# Patient Record
Sex: FEMALE | Race: WHITE | Employment: OTHER | URBAN - METROPOLITAN AREA
[De-identification: names, ages, dates, MRNs, and addresses within clinical notes are randomized per-mention and may not be internally consistent; named-entity substitution may affect disease eponyms.]

---

## 2017-07-25 ENCOUNTER — OFFICE VISIT (OUTPATIENT)
Dept: HEMATOLOGY | Age: 67
End: 2017-07-25

## 2017-07-25 VITALS
SYSTOLIC BLOOD PRESSURE: 149 MMHG | TEMPERATURE: 99 F | HEART RATE: 70 BPM | HEIGHT: 62 IN | WEIGHT: 254.4 LBS | DIASTOLIC BLOOD PRESSURE: 71 MMHG | BODY MASS INDEX: 46.81 KG/M2

## 2017-07-25 DIAGNOSIS — K75.81 NASH (NONALCOHOLIC STEATOHEPATITIS): Primary | ICD-10-CM

## 2017-07-25 RX ORDER — FUROSEMIDE 40 MG/1
TABLET ORAL DAILY
COMMUNITY

## 2017-07-25 RX ORDER — DULOXETIN HYDROCHLORIDE 60 MG/1
60 CAPSULE, DELAYED RELEASE ORAL DAILY
COMMUNITY

## 2017-07-25 RX ORDER — OMEPRAZOLE 20 MG/1
20 CAPSULE, DELAYED RELEASE ORAL DAILY
COMMUNITY

## 2017-07-25 RX ORDER — GABAPENTIN 400 MG/1
400 CAPSULE ORAL 3 TIMES DAILY
COMMUNITY

## 2017-07-25 NOTE — PROGRESS NOTES
[address] MD Marty, [address]       SUDHAKAR Yanes PA-C Charyl Chock, NP Michele Lab, NP        at [address]     [address] [place] [address], [address] Gabby Doe  22.     [phone]     FAX: [phone]    at [address], [id number] [place] [address], [address]     [phone]     FAX: [phone]       Patient Care Team:  Clare Bledsoe MD as PCP - General (Family Practice)  Richard Burleson MD (Gastroenterology)  Nghia Moore MD (Inactive) (Neurology)      Problem List  Date Reviewed: 7/27/2013          Codes Class Noted    Borderline diabetes ICD-10-CM: R73.03  ICD-9-CM: 790.29  7/31/2012        Snoring ICD-10-CM: R06.83  ICD-9-CM: 786.09  7/31/2012        Morbid obesity ([facility] ICD-10-CM: E66.01  ICD-9-CM: 278.01  7/31/2012        CROWELL (nonalcoholic steatohepatitis) ICD-10-CM: K75.81  ICD-9-CM: 571.8  6/15/2012        S/P cholecystectomy ICD-10-CM: Z90.49  ICD-9-CM: V45.79  6/15/2012    Overview Signed 6/15/2012 11:06 AM by Tiara Auguste MD     3/2012             Hypertension ICD-10-CM: I10  ICD-9-CM: 401.9  6/15/2012        Multiple sclerosis ([facility] [facility] ICD-10-CM: G35  ICD-9-CM: 397  6/15/2012        Restless legs ICD-10-CM: G25.81  ICD-9-CM: 333.94  6/15/2012    Overview Signed 6/15/2012 11:08 AM by Tiara Auguste MD     Possibly secondary to MS             Hypothyroidism ICD-10-CM: E03.9  ICD-9-CM: 244.9  6/15/2012        S/P total knee replacement ICD-10-CM: H04.474  ICD-9-CM: V43.65  6/15/2012    Overview Signed 6/15/2012 11:12 AM by Tiara Auguste MD     Left 2010             Obesity ICD-10-CM: O51.3  ICD-9-CM: 278.00  6/15/2012              Kristopher Bansal returns to the [address] for monitoring of CROWELL.   The active problem list, all pertinent past medical history, medications, liver histology, endoscopic studies, radiologic findings and laboratory findings related to the liver disorder were reviewed with the patient. She was last seen in 5/2013. A liver biopsy was performed in 3/2012. This demonstrated CROWELL with bridging fibrosis. The most recent laboratory studies indicate that the liver transaminases are normal, alkaline phosphatase is normal, tests of hepatic synthetic and metabolic function are normal, and the platelet count is normal.      The patient has has not had any change in her body weight in the past 4 year. She has explored gastric bypass but has backed away from this. The patient notes fatigue. The patient has limitations in functional activities secondary to other medical problems that are not related to the liver disease. The patient has not experienced problems concentrating, swelling of the abdomen, swelling of the lower extremities, hematemesis, hematochezia. Allergies   Allergen Reactions    Bactrim [Sulfamethoprim] Other (comments)    Dilaudid [Hydromorphone] Hives    Lortab Asa Hives    Percocet [Oxycodone-Acetaminophen] Hives       Current Outpatient Prescriptions   Medication Sig    omeprazole (PRILOSEC) 20 mg capsule Take 20 mg by mouth daily.  DULoxetine (CYMBALTA) 60 mg capsule Take 60 mg by mouth daily.  gabapentin (NEURONTIN) 400 mg capsule Take 400 mg by mouth three (3) times daily.  furosemide (LASIX) 40 mg tablet Take  by mouth daily.  levothyroxine (SYNTHROID) 100 mcg tablet Take  by mouth Daily (before breakfast).  clonazePAM (KLONOPIN) 2 mg tablet Take  by mouth two (2) times a day.  rOPINIRole (REQUIP) 1 mg tablet Take  by mouth three (3) times daily.  glatiramer (COPAXONE) 20 mg injection 20 mg by SubCUTAneous route daily.  multivitamin (ONE A DAY) tablet Take 1 Tab by mouth daily.  citalopram (CELEXA) 40 mg tablet Take 40 mg by mouth daily.  pregabalin (LYRICA) 50 mg capsule Take  by mouth.     lisinopril-hydrochlorothiazide (PRINZIDE, ZESTORETIC) 10-12.5 mg per tablet Take 2 Tabs by mouth daily.  gabapentin enacarbil (HORIZANT) 600 mg Tb24 Take  by mouth. No current facility-administered medications for this visit. SYSTEM REVIEW NOT RELATED TO LIVER DISEASE OR REVIEWED ABOVE:  Constitution systems: Negative for fever, chills, weight gain, weight loss. Eyes: Negative for visual changes. ENT: Negative for sore throat, painful swallowing. Respiratory: Negative for cough, hemoptysis, SOB. Cardiology: Negative for chest pain, palpitations. GI:  Negative for constipation or diarrhea. : Negative for urinary frequency, dysuria, hematuria, nocturia. Skin: Negative for rash. Hematology: Negative for easy bruising, blood clots. Musculo-skelatal: Negative for back pain, muscle pain, weakness. Neurologic: Negative for headaches, dizziness, vertigo, memory problems not related to HE. Psychology: Negative for anxiety, depression. FAMILY HISTORY:  There is no family history of liver disease. SOCIAL HISTORY:  The patient is . There are 2 children. The patient has never used tobacco products. The patient has never consumed significant amounts of alcohol. 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 The patient retired in [address] [place] [address] secondary to [address] [place] [address]:  Visit Vitals    /71 (BP 1 Location: Left arm, BP Patient Position: Sitting)    Pulse 70    Temp 99 °F (37.2 °C) (Tympanic)    Ht 5' 2\" (1.575 m)    Wt 254 lb 6.4 oz (115.4 kg)    BMI 46.53 kg/m2     General: No acute distress. Eyes: Sclera anicteric. ENT: No oral lesions. Thyroid normal.  Nodes: No adenopathy. Skin: No spider angiomata. No jaundice. No palmar erythema. Respiratory: Lungs clear to auscultation. Cardiovascular: Regular heart rate. No murmurs. No JVD. Abdomen: Soft non-tender. Liver size normal to percussion/palpation.   Spleen not palpable. No obvious ascites. Extremities: No edema. No muscle wasting. No gross arthritic changes. Neurologic: Alert and oriented. Cranial nerves grossly intact. No asterixsis. LABORATORY STUDIES:  From 2017  AST/ALT/ALP/T Bili/ALB:  55/34/129/0.5/4.1  WBC/HB/PLT/INR:  5.3/12.0/178  BUN/CREAT:  20/1.08    SEROLOGIES:  2010. HBsurface antigen negative, anti-HCV negative. Serologies Latest Ref Rng 6/15/2012   Ferritin 13 - 150 ng/mL 308 (H)   Iron % Saturation 15 - 55 % 31   SUSAN, IFA  Negative   ASMCA 0 - 19 Units 8   Ceruloplasmin 16.0 - 45.0 mg/dL 28.0   Alpha-1 antitrypsin level 90 - 200 mg/dL 143     LIVER HISTOLOGY:  3/2012. CROWELL with bridging fibrosis. ENDOSCOPIC PROCEDURES:  Not available or performed    RADIOLOGY:  Not available or performed    OTHER TESTIN2016. ECHO. LV hypertrophy. Normal LVEF  Normal valves. 2017. Quantiferon TB Gold. Negative    ASSESSMENT AND PLAN:  CROWELL with bridging fibrosis. We now have several clinical trials using promicing medications for patients with CROWELL. I am concerned that with having stage 3 fibrosis in 212 she has now progressed to cirrhosis. I have reviewed her medications and none seem to be an exclusion to enter a clinical trial.    She has been unable to loose any weight over the past 4 years with diet    She hs decided she does not want to pursue gastric bypass    The patient was directed to continue all current medications at the current dosages. The patient was counseled regarding alcohol consumption and that this could contribute to fatty liver disease. There are no contraindications for the patient to take any medications that are necessary for treatment of other medical issues including medications for diabetes mellitus and hypercholesterolemia. The need for vaccination against viral hepatitis A and B will be assessed with serologic and instituted as appropriate.     [facility] 87 in 4 months.   She will return sooner if she decides to enter a clinical trial.    Virgilio Gray MD  Liver Vina [address] [address] Kathie Billy 7  Gabby Jeronimo  22.  [phone]

## 2017-07-25 NOTE — MR AVS SNAPSHOT
Visit Information Date & Time Provider Department Dept. Phone Encounter #  
 7/25/2017 12:30 PM Blu [facility]e MD [facility] [id number] Follow-up Instructions Return in about 4 months (around 11/25/2017) for NP. Your Appointments 11/14/2017 10:00 AM  
Follow Up with April Clarissa Johnson NP [facility] Kathleen Zac ([address]) Appt Note: 4 month follow up [address] [place] [address] [phone] [place] [address] [facility] [address]  
[address] [facility] [address] Upcoming Health Maintenance Date Due Hepatitis C Screening 1950 DTaP/Tdap/Td series (1 - Tdap) 6/29/1970 FOBT Q 1 YEAR AGE 50-75 6/29/1999 ZOSTER VACCINE AGE 60> 4/29/2009 GLAUCOMA SCREENING Q2Y 6/29/2014 OSTEOPOROSIS SCREENING (DEXA) 6/29/2014 Pneumococcal 65+ Low/Medium Risk (1 of 2 - PCV13) 6/29/2014 MEDICARE YEARLY EXAM 6/29/2014 INFLUENZA AGE 9 TO ADULT 8/1/2017 BREAST CANCER SCRN MAMMOGRAM 11/29/2018 Allergies as of 7/25/2017  Review Complete On: 5/31/2013 By: Benito Garner LPN Severity Noted Reaction Type Reactions Bactrim [Sulfamethoprim]  07/25/2017    Other (comments) Dilaudid [Hydromorphone]  06/15/2012   Side Effect Hives Lortab Asa  06/15/2012   Side Effect Hives Percocet [Oxycodone-acetaminophen]  06/15/2012   Side Effect Hives Current Immunizations  Never Reviewed No immunizations on file. Not reviewed this visit Vitals BP Pulse Temp Height(growth percentile) Weight(growth percentile) BMI  
 149/71 (BP 1 Location: Left arm, BP Patient Position: Sitting) 70 99 °F (37.2 °C) (Tympanic) 5' 2\" (1.575 m) 254 lb 6.4 oz (115.4 kg) 46.53 kg/m2 Smoking Status Former Smoker BMI and BSA Data Body Mass Index Body Surface Area  
 46.53 kg/m 2 2.25 m 2 Your Updated Medication List  
  
   
 This list is accurate as of: 7/25/17  1:04 PM.  Always use your most recent med list.  
  
  
  
  
 citalopram 40 mg tablet Commonly known as:  Rafael Rochaippo Take 40 mg by mouth daily. clonazePAM 2 mg tablet Commonly known as:  Blanca Leslie Take  by mouth two (2) times a day. COPAXONE 20 mg/mL injection Generic drug:  glatiramer 20 mg by SubCUTAneous route daily. DULoxetine 60 mg capsule Commonly known as:  CYMBALTA Take 60 mg by mouth daily. furosemide 40 mg tablet Commonly known as:  LASIX Take  by mouth daily. gabapentin 400 mg capsule Commonly known as:  NEURONTIN Take 400 mg by mouth three (3) times daily. HORIZANT 600 mg Tber Generic drug:  gabapentin enacarbil Take  by mouth.  
  
 levothyroxine 100 mcg tablet Commonly known as:  SYNTHROID Take  by mouth Daily (before breakfast). lisinopril-hydroCHLOROthiazide 10-12.5 mg per tablet Commonly known as:  Kathee Adalgisa Take 2 Tabs by mouth daily. LYRICA 50 mg capsule Generic drug:  pregabalin Take  by mouth.  
  
 multivitamin tablet Commonly known as:  ONE A DAY Take 1 Tab by mouth daily. omeprazole 20 mg capsule Commonly known as:  PRILOSEC Take 20 mg by mouth daily. REQUIP 1 mg tablet Generic drug:  rOPINIRole Take  by mouth three (3) times daily. Follow-up Instructions Return in about 4 months (around 11/25/2017) for NP. Introducing [facility] & HEALTH SERVICES! Az Gerardo introduces WatchGuard patient portal. Now you can access parts of your medical record, email your doctor's office, and request medication refills online. 1. In your internet browser, go to [email or web address] 2. Click on the First Time User? Click Here link in the Sign In box. You will see the New Member Sign Up page. 3. Enter your WatchGuard Access Code exactly as it appears below.  You will not need to use this code after youve completed the sign-up process. If you do not sign up before the expiration date, you must request a new code. · UNYQ Access Code: [email or web address] Expires: 9/17/2017  7:04 AM 
 
4. Enter the last four digits of your Social Security Number (xxxx) and Date of Birth (mm/dd/yyyy) as indicated and click Submit. You will be taken to the next sign-up page. 5. Create a UNYQ ID. This will be your UNYQ login ID and cannot be changed, so think of one that is secure and easy to remember. 6. Create a UNYQ password. You can change your password at any time. 7. Enter your Password Reset Question and Answer. This can be used at a later time if you forget your password. 8. Enter your e-mail address. You will receive e-mail notification when new information is available in [address] 9. Click Sign Up. You can now view and download portions of your medical record. 10. Click the Download Summary menu link to download a portable copy of your medical information. If you have questions, please visit the Frequently Asked Questions section of the UNYQ website. Remember, UNYQ is NOT to be used for urgent needs. For medical emergencies, dial 911. Now available from your iPhone and Android! Please provide this summary of care documentation to your next provider. Your primary care clinician is listed as Arn Randa. If you have any questions after today's visit, please call [phone].

## 2017-11-14 ENCOUNTER — OFFICE VISIT (OUTPATIENT)
Dept: HEMATOLOGY | Age: 67
End: 2017-11-14

## 2017-11-14 VITALS
SYSTOLIC BLOOD PRESSURE: 171 MMHG | BODY MASS INDEX: 46.93 KG/M2 | OXYGEN SATURATION: 97 % | TEMPERATURE: 95.6 F | WEIGHT: 256.6 LBS | HEART RATE: 68 BPM | DIASTOLIC BLOOD PRESSURE: 75 MMHG

## 2017-11-14 DIAGNOSIS — K75.81 NASH (NONALCOHOLIC STEATOHEPATITIS): Primary | ICD-10-CM

## 2017-11-14 DIAGNOSIS — E66.01 MORBID OBESITY (HCC): ICD-10-CM

## 2017-11-14 DIAGNOSIS — I10 ESSENTIAL HYPERTENSION: ICD-10-CM

## 2017-11-14 DIAGNOSIS — R73.03 BORDERLINE DIABETES: ICD-10-CM

## 2017-11-14 DIAGNOSIS — E03.9 ACQUIRED HYPOTHYROIDISM: ICD-10-CM

## 2017-11-14 RX ORDER — METHYLPHENIDATE HYDROCHLORIDE 10 MG/1
TABLET ORAL
COMMUNITY
Start: 2017-11-13 | End: 2017-11-14 | Stop reason: ALTCHOICE

## 2017-11-14 RX ORDER — BESIFLOXACIN 6 MG/ML
SUSPENSION OPHTHALMIC
COMMUNITY
Start: 2017-10-19 | End: 2017-11-14 | Stop reason: ALTCHOICE

## 2017-11-14 RX ORDER — DOXAZOSIN 4 MG/1
TABLET ORAL
COMMUNITY
Start: 2017-11-01

## 2017-11-14 RX ORDER — TOLTERODINE 4 MG/1
CAPSULE, EXTENDED RELEASE ORAL
COMMUNITY
Start: 2017-11-01

## 2017-11-14 RX ORDER — GLATIRAMER ACETATE 20 MG/ML
INJECTION, SOLUTION SUBCUTANEOUS
COMMUNITY
Start: 2017-10-30 | End: 2017-11-14 | Stop reason: ALTCHOICE

## 2017-11-14 RX ORDER — POTASSIUM CHLORIDE 750 MG/1
CAPSULE, EXTENDED RELEASE ORAL
COMMUNITY
Start: 2017-10-31

## 2017-11-14 RX ORDER — CLONAZEPAM 1 MG/1
TABLET ORAL
COMMUNITY
Start: 2017-11-01

## 2017-11-14 NOTE — MR AVS SNAPSHOT
Visit Information Date & Time Provider Department Dept. Phone Encounter #  
 11/14/2017 10:00 AM April KIMMIE Tolliver, [facility] 219 [id number] Upcoming Health Maintenance Date Due Hepatitis C Screening 1950 DTaP/Tdap/Td series (1 - Tdap) 6/29/1970 FOBT Q 1 YEAR AGE 50-75 6/29/1999 ZOSTER VACCINE AGE 60> 4/29/2009 GLAUCOMA SCREENING Q2Y 6/29/2014 OSTEOPOROSIS SCREENING (DEXA) 6/29/2014 Pneumococcal 65+ Low/Medium Risk (1 of 2 - PCV13) 6/29/2014 MEDICARE YEARLY EXAM 6/29/2014 Influenza Age 5 to Adult 8/1/2017 BREAST CANCER SCRN MAMMOGRAM 11/29/2018 Allergies as of 11/14/2017  Review Complete On: 11/14/2017 By: Rosario Ardon Members, NP Severity Noted Reaction Type Reactions Bactrim [Sulfamethoprim]  07/25/2017    Other (comments) Dilaudid [Hydromorphone]  06/15/2012   Side Effect Hives Lortab Asa  06/15/2012   Side Effect Hives Percocet [Oxycodone-acetaminophen]  06/15/2012   Side Effect Hives Current Immunizations  Never Reviewed No immunizations on file. Not reviewed this visit Vitals BP Pulse Temp Weight(growth percentile) SpO2 BMI  
 171/75 68 95.6 °F (35.3 °C) (Tympanic) 256 lb 9.6 oz (116.4 kg) 97% 46.93 kg/m2 Smoking Status Former Smoker Vitals History BMI and BSA Data Body Mass Index Body Surface Area  
 46.93 kg/m 2 2.26 m 2 Your Updated Medication List  
  
   
This list is accurate as of: 11/14/17 10:35 AM.  Always use your most recent med list.  
  
  
  
  
 clonazePAM 1 mg tablet Commonly known as:  KlonoPIN  
  
 COPAXONE 20 mg/mL injection Generic drug:  glatiramer 20 mg by SubCUTAneous route daily. doxazosin 4 mg tablet Commonly known as:  CARDURA DULoxetine 60 mg capsule Commonly known as:  CYMBALTA Take 60 mg by mouth daily. furosemide 40 mg tablet Commonly known as:  LASIX Take  by mouth daily. gabapentin 400 mg capsule Commonly known as:  NEURONTIN Take 400 mg by mouth three (3) times daily. levothyroxine 100 mcg tablet Commonly known as:  SYNTHROID Take  by mouth Daily (before breakfast). multivitamin tablet Commonly known as:  ONE A DAY Take 1 Tab by mouth daily. omeprazole 20 mg capsule Commonly known as:  PRILOSEC Take 20 mg by mouth daily. potassium chloride SA 10 mEq capsule Commonly known as:  MICRO-K  
  
 REQUIP 1 mg tablet Generic drug:  rOPINIRole Take  by mouth three (3) times daily. tolterodine ER 4 mg ER capsule Commonly known as:  Newton Andrews To-Do List   
 11/30/2017 10:15 AM  
  Appointment with [facility] 4 at [address] [place] [address] ([phone]) Shower or bathe using soap and water. Do not use deodorant, powder, perfumes, or lotion the day of your exam.  If your prior mammograms were not performed at [facility] 6 please bring films with you or forward prior images 2 days before your procedure. Check in at registration 15min before your appointment time unless you were instructed to do otherwise. A script is not necessary, but if you have one, please bring it on the day of the mammogram or have it faxed to the department. [facility] [phone] [facility]  [phone] [facility] 19 AZUL  [phone] Replaced by [facility] [phone] [place] [address] [facility] [phone] Introducing [facility] & HEALTH SERVICES! Columba Carmen introduces Ubooly patient portal. Now you can access parts of your medical record, email your doctor's office, and request medication refills online. 1. In your internet browser, go to [email or web address] 2. Click on the First Time User? Click Here link in the Sign In box. You will see the New Member Sign Up page. 3. Enter your Ubooly Access Code exactly as it appears below. You will not need to use this code after youve completed the sign-up process.  If you do not sign up before the expiration date, you must request a new code. · [email or web address] Access Code: [email or web address] Expires: 2/12/2018 10:35 AM 
 
4. Enter the last four digits of your Social Security Number (xxxx) and Date of Birth (mm/dd/yyyy) as indicated and click Submit. You will be taken to the next sign-up page. 5. Create a [email or web address] ID. This will be your [email or web address] login ID and cannot be changed, so think of one that is secure and easy to remember. 6. Create a [email or web address] password. You can change your password at any time. 7. Enter your Password Reset Question and Answer. This can be used at a later time if you forget your password. 8. Enter your e-mail address. You will receive e-mail notification when new information is available in [address] 9. Click Sign Up. You can now view and download portions of your medical record. 10. Click the Download Summary menu link to download a portable copy of your medical information. If you have questions, please visit the Frequently Asked Questions section of the [email or web address] website. Remember, [email or web address] is NOT to be used for urgent needs. For medical emergencies, dial 911. Now available from your iPhone and Android! Please provide this summary of care documentation to your next provider. Your primary care clinician is listed as America Kelly. If you have any questions after today's visit, please call [phone].

## 2017-11-14 NOTE — PROGRESS NOTES
[address] Mathew Ferguson MD, [address], [facility], [place]       SUDHAKAR Teran PA-C Marcie Saltness, [facility]   SUDHAKAR Suazo NP        at [address], [address] [facility]     [facility], [address]     [phone]     FAX: [phone]    at [address], [id number] [place] [address], [address]     [phone]     FAX: [phone]     Patient Care Team:  Mckayla Malin MD as PCP - General (Family Practice)  Danni Duenas MD (Gastroenterology)  Liza Aguayo MD (Inactive) (Neurology)    Patient Active Problem List   Diagnosis Code    CROWELL (nonalcoholic steatohepatitis) K75.81    S/P cholecystectomy Z90.49    Hypertension I10    Multiple sclerosis ([address] G35    Restless legs G25.81    Hypothyroidism E03.9    S/P total knee replacement Z96.659    Obesity E66.9    Borderline diabetes R73.03    Snoring R06.83    Morbid obesity ([address] E66.01       [facility] CROWELL. The active problem list, all pertinent past medical history, medications, liver histology, endoscopic studies, radiologic findings and laboratory findings related to the liver disorder were reviewed with the patient. A liver biopsy was performed in 3/2012. This demonstrated CROWELL with bridging fibrosis. She has had significant weight gain over time. Her BMI is currently ~47. She has explored gastric bypass but has backed away from this. The patient notes ongoing fatigue and arthralgias secondary to MS. She continues to follow up with neurology on a regular basis to manage this. The patient has limitations in functional activities secondary to other medical problems that are not related to the liver disease.  The patient has not experienced problems concentrating, swelling of the abdomen, swelling of the lower extremities, hematemesis, hematochezia or melena. Allergies   Allergen Reactions    Bactrim [Sulfamethoprim] Other (comments)    Dilaudid [Hydromorphone] Hives    Lortab Asa Hives    Percocet [Oxycodone-Acetaminophen] Hives       Current Outpatient Prescriptions   Medication Sig    clonazePAM (KLONOPIN) 1 mg tablet     doxazosin (CARDURA) 4 mg tablet     potassium chloride SA (MICRO-K) 10 mEq capsule     tolterodine ER (DETROL LA) 4 mg ER capsule     omeprazole (PRILOSEC) 20 mg capsule Take 20 mg by mouth daily.  DULoxetine (CYMBALTA) 60 mg capsule Take 60 mg by mouth daily.  gabapentin (NEURONTIN) 400 mg capsule Take 400 mg by mouth three (3) times daily.  furosemide (LASIX) 40 mg tablet Take  by mouth daily.  levothyroxine (SYNTHROID) 100 mcg tablet Take  by mouth Daily (before breakfast).  rOPINIRole (REQUIP) 1 mg tablet Take  by mouth three (3) times daily.  glatiramer (COPAXONE) 20 mg injection 20 mg by SubCUTAneous route daily.  multivitamin (ONE A DAY) tablet Take 1 Tab by mouth daily. No current facility-administered medications for this visit. SYSTEM REVIEW NOT RELATED TO LIVER DISEASE OR REVIEWED ABOVE:  Constitution systems: Negative for fever, chills, weight gain, weight loss. Eyes: Negative for visual changes. ENT: Negative for sore throat, painful swallowing. Respiratory: Negative for cough, hemoptysis, SOB. Cardiology: Negative for chest pain, palpitations. GI:  Negative for constipation or diarrhea. : Negative for urinary frequency, dysuria, hematuria, nocturia. Skin: Negative for rash. Hematology: Negative for easy bruising, blood clots. Musculo-skelatal: Negative for back pain, muscle pain, weakness. Neurologic: Negative for headaches, dizziness, vertigo, memory problems not related to HE. Psychology: Negative for anxiety, depression. FAMILY HISTORY:  There is no family history of liver disease.     SOCIAL HISTORY:  The patient is . There are 2 children. The patient has never used tobacco products. The patient has never consumed significant amounts of alcohol. 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 The patient retired in [address] [place] [address] secondary to [address]:  Visit Vitals    /75    Pulse 68    Temp 95.6 °F (35.3 °C) (Tympanic)    Wt 256 lb 9.6 oz (116.4 kg)    SpO2 97%    BMI 46.93 kg/m2     General: No acute distress. Eyes: Sclera anicteric. ENT: No oral lesions. Thyroid normal.  Nodes: No adenopathy. Skin: No spider angiomata. No jaundice. No palmar erythema. Respiratory: Lungs clear to auscultation. Cardiovascular: Regular heart rate. No murmurs. No JVD. Abdomen: Soft non-tender. Liver size normal to percussion/palpation. Spleen not palpable. No obvious ascites. Extremities: No edema. No muscle wasting. No gross arthritic changes. Neurologic: Alert and oriented. Cranial nerves grossly intact. No asterixsis. LABORATORY STUDIES:  From 10/2017  AST/ALT/ALP/T Bili/ALB:  46/35/114/0.7/4.2  BUN/CREAT:  27/1.1    From 2017  AST/ALT/ALP/T Bili/ALB:  55/34/129/0.5/4.1  WBC/HB/PLT/INR:  5.3/12.0/178  BUN/CREAT:  20/1.08    SEROLOGIES:  2010. HBsurface antigen negative, anti-HCV negative. Serologies Latest Ref [facility] 6/15/2012   Ferritin 13 - 150 ng/mL 308 (H)   Iron % Saturation 15 - 55 % 31   SUSAN, IFA  Negative   ASMCA 0 - 19 Units 8   Ceruloplasmin 16.0 - 45.0 mg/dL 28.0   Alpha-1 antitrypsin level 90 - 200 mg/dL 143     LIVER HISTOLOGY:  3/2012. CROWELL with bridging fibrosis. ENDOSCOPIC PROCEDURES:  Not available or performed    RADIOLOGY:  Not available or performed    OTHER TESTIN2016. ECHO. LV hypertrophy. Normal LVEF. Normal valves. 2017. Quantiferon TB Gold. Negative    ASSESSMENT AND PLAN:  CROWELL with bridging fibrosis in . Liver enzymes and function are within normal range.  Her liver disease has most likely progressed over the past 5 years given that she has gained weight over time. We now have several clinical trials using promising medications for patients with CROWELL. She is interested in participating in a study. Our research team met with patient today and gave her a consent to read over. She will be contacted within the next 2 weeks to discuss the study. Hypertension. Patient's BP is significantly elevated in the clinic today. Discussed the importance of regular follow up with their PCP to monitor/manage this. Patient verbalized understanding. The patient was directed to continue all current medications at the current dosages. The patient was counseled regarding alcohol consumption and that this could contribute to fatty liver disease. There are no contraindications for the patient to take any medications that are necessary for treatment of other medical issues including medications for diabetes mellitus and hypercholesterolemia. [facility]. surveillance. Ordered an ultrasound today to assess for any liver masses. The need for vaccination against viral hepatitis A and B will be assessed with serologic and instituted as appropriate. [address] [place] [address] 87 in 4 months.  She will return sooner if she decides to enter a clinical trial.    April Doreen Hutchinson NP  Liver [place] [address]  Ph: [phone]  Fax: [phone]

## 2017-11-28 ENCOUNTER — HOSPITAL ENCOUNTER (OUTPATIENT)
Dept: ULTRASOUND IMAGING | Age: 67
Discharge: HOME | End: 2017-11-28
Attending: NURSE PRACTITIONER
Payer: MEDICARE

## 2017-11-28 DIAGNOSIS — K75.81 NASH (NONALCOHOLIC STEATOHEPATITIS): ICD-10-CM

## 2017-11-28 PROCEDURE — 76705 ECHO EXAM OF ABDOMEN: CPT

## 2017-11-30 ENCOUNTER — HOSPITAL ENCOUNTER (OUTPATIENT)
Dept: MAMMOGRAPHY | Age: 67
Discharge: HOME | End: 2017-11-30
Attending: FAMILY MEDICINE
Payer: MEDICARE

## 2017-11-30 DIAGNOSIS — Z12.39 BREAST SCREENING: ICD-10-CM

## 2017-11-30 PROCEDURE — 77067 SCR MAMMO BI INCL CAD: CPT

## 2019-01-10 ENCOUNTER — HOSPITAL ENCOUNTER (OUTPATIENT)
Dept: MAMMOGRAPHY | Age: 69
Discharge: HOME | End: 2019-01-10
Attending: FAMILY MEDICINE
Payer: MEDICARE

## 2019-01-10 DIAGNOSIS — Z12.39 SCREENING BREAST EXAMINATION: ICD-10-CM

## 2019-01-10 PROCEDURE — 77067 SCR MAMMO BI INCL CAD: CPT

## 2020-10-09 ENCOUNTER — HOSPITAL ENCOUNTER (OUTPATIENT)
Dept: MAMMOGRAPHY | Age: 70
Discharge: HOME | End: 2020-10-09
Attending: INTERNAL MEDICINE
Payer: MEDICARE

## 2020-10-09 ENCOUNTER — HOSPITAL ENCOUNTER (OUTPATIENT)
Dept: MAMMOGRAPHY | Age: 70
Discharge: HOME | End: 2020-10-09
Attending: FAMILY MEDICINE
Payer: MEDICARE

## 2020-10-09 DIAGNOSIS — Z12.31 VISIT FOR SCREENING MAMMOGRAM: ICD-10-CM

## 2020-10-09 DIAGNOSIS — M81.0 AGE-RELATED OSTEOPOROSIS WITHOUT CURRENT PATHOLOGICAL FRACTURE: ICD-10-CM

## 2020-10-09 PROCEDURE — 77063 BREAST TOMOSYNTHESIS BI: CPT

## 2020-10-09 PROCEDURE — 77080 DXA BONE DENSITY AXIAL: CPT

## 2021-07-27 ENCOUNTER — HOSPITAL ENCOUNTER (EMERGENCY)
Age: 71
Discharge: HOME | End: 2021-07-27
Attending: EMERGENCY MEDICINE | Admitting: EMERGENCY MEDICINE
Payer: MEDICARE

## 2021-07-27 ENCOUNTER — APPOINTMENT (OUTPATIENT)
Dept: GENERAL RADIOLOGY | Age: 71
End: 2021-07-27
Attending: EMERGENCY MEDICINE
Payer: MEDICARE

## 2021-07-27 VITALS
BODY MASS INDEX: 44.23 KG/M2 | DIASTOLIC BLOOD PRESSURE: 52 MMHG | TEMPERATURE: 99.3 F | SYSTOLIC BLOOD PRESSURE: 124 MMHG | WEIGHT: 241.84 LBS | RESPIRATION RATE: 18 BRPM | HEART RATE: 67 BPM | OXYGEN SATURATION: 97 %

## 2021-07-27 DIAGNOSIS — M79.672 LEFT FOOT PAIN: Primary | ICD-10-CM

## 2021-07-27 PROCEDURE — 73620 X-RAY EXAM OF FOOT: CPT

## 2021-07-27 PROCEDURE — 99283 EMERGENCY DEPT VISIT LOW MDM: CPT

## 2021-07-27 RX ORDER — NAPROXEN 250 MG/1
250 TABLET ORAL 2 TIMES DAILY WITH MEALS
Qty: 20 TABLET | Refills: 0 | Status: SHIPPED | OUTPATIENT
Start: 2021-07-27 | End: 2021-08-06

## 2021-07-27 NOTE — ED NOTES
Patient discharged with vital signs stable, AVS and prescriptions, in no acute distress. Pt wheeled to car. Difficulty ambulating. Uses rollator.

## 2021-07-27 NOTE — ED PROVIDER NOTES
71-year-old female with history of headaches, hypertension, diabetes, thyroid disease presents to the emergency department today with chief complaint of left foot pain. This is been going on for the past several days. There is no reported injury although there is some bruising just proximal to the great toe. She has been using Tylenol and gabapentin at home without significant relief. The history is provided by the patient and medical records. Foot Pain   This is a new problem. The current episode started more than 2 days ago. The problem occurs constantly. The problem has been gradually worsening. The pain is present in the left foot. The quality of the pain is described as dull and aching. The pain is severe. Pertinent negatives include no numbness, full range of motion, no stiffness, no tingling, no itching, no back pain and no neck pain. She has tried cold for the symptoms. There has been no history of extremity trauma.         Past Medical History:   Diagnosis Date    Headache(784.0)     Hypertension     Musculoskeletal disorder     Nonalcoholic steatohepatitis (CROWELL)     Thyroid disease        Past Surgical History:   Procedure Laterality Date    HX CHOLECYSTECTOMY      HX HYSTERECTOMY      18's    RUDDY BIOPSY BREAST STEREOTACTIC Right     years ago - Benign    ID TOTAL KNEE ARTHROPLASTY           Family History:   Problem Relation Age of Onset    Cancer Mother     Breast Cancer Mother 61    Hypertension Sister     Hypertension Brother        Social History     Socioeconomic History    Marital status: SINGLE     Spouse name: Not on file    Number of children: Not on file    Years of education: Not on file    Highest education level: Not on file   Occupational History    Not on file   Tobacco Use    Smoking status: Former Smoker    Smokeless tobacco: Never Used   Substance and Sexual Activity    Alcohol use: No    Drug use: No    Sexual activity: Not on file   Other Topics Concern  Not on file   Social History Narrative    Not on file     Social Determinants of Health     Financial Resource Strain:     Difficulty of Paying Living Expenses:    Food Insecurity:     Worried About Running Out of Food in the Last Year:     [address] in the Last Year:    Transportation Needs:     Lack of Transportation (Medical):  Lack of Transportation (Non-Medical):    Physical Activity:     Days of Exercise per Week:     Minutes of Exercise per Session:    Stress:     Feeling of Stress :    Social Connections:     Frequency of Communication with Friends and Family:     Frequency of Social Gatherings with Friends and Family:     Attends Quaker Services:     Active Member of Clubs or Organizations:     Attends Club or Organization Meetings:     Marital Status:    Intimate Partner Violence:     Fear of Current or Ex-Partner:     Emotionally Abused:     Physically Abused:     Sexually Abused: ALLERGIES: Bactrim [sulfamethoprim], Dilaudid [hydromorphone], Lortab asa, and Percocet [oxycodone-acetaminophen]    Review of Systems   Constitutional: Negative for fatigue and fever. HENT: Negative for sneezing and sore throat. Respiratory: Negative for cough and shortness of breath. Cardiovascular: Negative for chest pain and leg swelling. Gastrointestinal: Negative for abdominal pain, diarrhea, nausea and vomiting. Genitourinary: Negative for difficulty urinating and dysuria. Musculoskeletal: Negative for arthralgias, back pain, myalgias, neck pain and stiffness. Skin: Negative for color change, itching and rash. Neurological: Negative for tingling, weakness, numbness and headaches. Psychiatric/Behavioral: Negative for agitation and behavioral problems. Vitals:    07/27/21 1346   BP: 116/61   Pulse: 67   Resp: 18   Temp: 99.3 °F (37.4 °C)   SpO2: 95%   Weight: 109.7 kg (241 lb 13.5 oz)            Physical Exam  Vitals and nursing note reviewed. Constitutional:       General: She is not in acute distress. Appearance: Normal appearance. She is well-developed. She is obese. She is not ill-appearing, toxic-appearing or diaphoretic. HENT:      Head: Normocephalic and atraumatic. Nose: Nose normal.      Mouth/Throat:      Mouth: Mucous membranes are moist.      Pharynx: Oropharynx is clear. Eyes:      Extraocular Movements: Extraocular movements intact. Conjunctiva/sclera: Conjunctivae normal.      Pupils: Pupils are equal, round, and reactive to light. Cardiovascular:      Rate and Rhythm: Normal rate and regular rhythm. Pulses: Normal pulses. Pulmonary:      Effort: Pulmonary effort is normal. No respiratory distress. Musculoskeletal:         General: Tenderness present. No swelling, deformity or signs of injury. Normal range of motion. Cervical back: Normal range of motion and neck supple. No rigidity. No muscular tenderness. Right lower leg: No edema. Left lower leg: No edema. Feet:    Feet:      Comments: Ecchymosis proximal to the left great toe. There is no deformity. There is generalized soft tissue tenderness. No erythema or warmth. Skin:     General: Skin is warm and dry. Capillary Refill: Capillary refill takes less than 2 seconds. Neurological:      General: No focal deficit present. Mental Status: She is alert and oriented to person, place, and time. Psychiatric:         Mood and Affect: Mood normal.         Behavior: Behavior normal.          MDM  Number of Diagnoses or Management Options  Diagnosis management comments: 65-year-old female presents as above with left foot pain. No specific trauma although there is ecchymosis about the great toe. Plan to treat with Ace wrap, anti-inflammatory, follow-up with primary care, return if needed.        Amount and/or Complexity of Data Reviewed  Tests in the radiology section of CPT®: reviewed           Procedures

## 2021-07-27 NOTE — ED TRIAGE NOTES
TRIAGE NOTE: Pt arrives for left ankle and foot pain 4-5 days. Pt has been unable to get out of bed. Bruising noted to left great toe, outer ankle pain. Tried gabapentin, tylenol. Tylenol last at 1030    Pain described as throbbing.

## 2022-02-23 ENCOUNTER — OFFICE VISIT (OUTPATIENT)
Dept: ORTHOPEDIC SURGERY | Age: 72
End: 2022-02-23

## 2022-02-23 DIAGNOSIS — M25.569 KNEE PAIN, UNSPECIFIED CHRONICITY, UNSPECIFIED LATERALITY: Primary | ICD-10-CM

## 2022-03-19 PROBLEM — M25.569 KNEE PAIN: Status: ACTIVE | Noted: 2022-02-23

## 2022-03-29 ENCOUNTER — TELEPHONE (OUTPATIENT)
Dept: SURGERY | Age: 72
End: 2022-03-29

## 2022-04-11 ENCOUNTER — OFFICE VISIT (OUTPATIENT)
Dept: SURGERY | Age: 72
End: 2022-04-11
Payer: MEDICARE

## 2022-04-11 VITALS
DIASTOLIC BLOOD PRESSURE: 59 MMHG | HEIGHT: 62 IN | OXYGEN SATURATION: 97 % | BODY MASS INDEX: 47.66 KG/M2 | WEIGHT: 259 LBS | HEART RATE: 66 BPM | TEMPERATURE: 97.2 F | SYSTOLIC BLOOD PRESSURE: 133 MMHG | RESPIRATION RATE: 20 BRPM

## 2022-04-11 DIAGNOSIS — E83.52 HYPERCALCEMIA: Primary | ICD-10-CM

## 2022-04-11 PROCEDURE — G8417 CALC BMI ABV UP PARAM F/U: HCPCS | Performed by: SURGERY

## 2022-04-11 PROCEDURE — 1090F PRES/ABSN URINE INCON ASSESS: CPT | Performed by: SURGERY

## 2022-04-11 PROCEDURE — G8536 NO DOC ELDER MAL SCRN: HCPCS | Performed by: SURGERY

## 2022-04-11 PROCEDURE — G8752 SYS BP LESS 140: HCPCS | Performed by: SURGERY

## 2022-04-11 PROCEDURE — 99204 OFFICE O/P NEW MOD 45 MIN: CPT | Performed by: SURGERY

## 2022-04-11 PROCEDURE — G8427 DOCREV CUR MEDS BY ELIG CLIN: HCPCS | Performed by: SURGERY

## 2022-04-11 PROCEDURE — G8432 DEP SCR NOT DOC, RNG: HCPCS | Performed by: SURGERY

## 2022-04-11 PROCEDURE — 1101F PT FALLS ASSESS-DOCD LE1/YR: CPT | Performed by: SURGERY

## 2022-04-11 PROCEDURE — 3017F COLORECTAL CA SCREEN DOC REV: CPT | Performed by: SURGERY

## 2022-04-11 PROCEDURE — G9899 SCRN MAM PERF RSLTS DOC: HCPCS | Performed by: SURGERY

## 2022-04-11 PROCEDURE — G8754 DIAS BP LESS 90: HCPCS | Performed by: SURGERY

## 2022-04-11 PROCEDURE — G8399 PT W/DXA RESULTS DOCUMENT: HCPCS | Performed by: SURGERY

## 2022-04-11 NOTE — Clinical Note
5/5/2022    Patient: Johanna Espana   YOB: 1950   Date of Visit: 4/11/2022     Thu Buck MD  [address] [facility] [address]  Via Fax: [phone]     Nancy Li MD  [facility] [address]  [address]  [place] [address]  Via In [facility] [address]    Dear MD Nancy Springer MD,      Thank you for referring Ms. Lovella Castleman to  Jackie Ferguson for evaluation. My notes for this consultation are attached. If you have questions, please do not hesitate to call me. I look forward to following your patient along with you.       Sincerely,    Abhay Monge MD

## 2022-04-11 NOTE — PROGRESS NOTES
Identified pt with two pt identifiers(name and ). Reviewed record in preparation for visit and have obtained necessary documentation. All patient medications has been reviewed. Chief Complaint   Patient presents with    Thyroid Problem     seen at the request of Dr. Yoshi rodriguez hyperthyroidism        Health Maintenance Due   Topic    Hepatitis C Screening     Depression Screen     COVID-19 Vaccine (1)    Lipid Screen     Colorectal Cancer Screening Combo     Shingrix Vaccine Age 50> (1 of 2)    A1C test (Diabetic or Prediabetic)     Medicare Yearly Exam     Breast Cancer Screen Mammogram        Vitals:    22 0926   BP: (!) 133/59   Pulse: 66   Resp: 20   Temp: 97.2 °F (36.2 °C)   SpO2: 97%   Weight: 117.5 kg (259 lb)   Height: 5' 2\" (1.575 m)   PainSc:   4   PainLoc: Chest       4. Have you been to the ER, urgent care clinic since your last visit? Hospitalized since your last visit? new patient    5. Have you seen or consulted any other health care providers outside of the [address] [place] [address] since your last visit? Include any pap smears or colon screening. new patient      Patient is accompanied by self I have received verbal consent from [facility] to discuss any/all medical information while they are present in the room.

## 2022-04-12 NOTE — TELEPHONE ENCOUNTER
PT STATES DR. WALDRON'S OFFICE STATED THEY HAVE FAXED REPORTS 2x.    PT STATED THIS IS BEST CONTACT #  [phone] FOR OFFICE TO CLARIFY INFORMATION ABOUT ABOUT REPORTS

## 2022-04-19 ENCOUNTER — TELEPHONE (OUTPATIENT)
Dept: SURGERY | Age: 72
End: 2022-04-19

## 2022-04-19 NOTE — TELEPHONE ENCOUNTER
Patient wants to know if Dr. Lida Rey received the disk from [facility], please call

## 2022-04-25 ENCOUNTER — TELEPHONE (OUTPATIENT)
Dept: SURGERY | Age: 72
End: 2022-04-25

## 2022-04-25 NOTE — TELEPHONE ENCOUNTER
Spoke with patient using 2 identifiers, ( name & ) once orders for surgery or further test received from provider will call her. Express understanding.

## 2022-04-25 NOTE — TELEPHONE ENCOUNTER
Patient called following up from appointment on 4/11/22, patient stated she has not heard from anyone and is wanting to know what MD can/cannot do    Please call

## 2022-04-28 DIAGNOSIS — E83.52 HYPERCALCEMIA: Primary | ICD-10-CM

## 2022-04-28 NOTE — PROGRESS NOTES
Called to let her know that the ultrasound from [facility] was not overwhelming in terms of finding a specific parathyroid adenoma. I explained that sestamibi will help clarify so that we can hopefully limit the scope of the operation. I ordered the test and have asked her to give us a call back. Left her a voicemail.

## 2022-04-29 ENCOUNTER — TRANSCRIBE ORDER (OUTPATIENT)
Dept: SCHEDULING | Age: 72
End: 2022-04-29

## 2022-04-29 ENCOUNTER — TELEPHONE (OUTPATIENT)
Dept: SURGERY | Age: 72
End: 2022-04-29

## 2022-04-29 NOTE — TELEPHONE ENCOUNTER
PT WOULD LIKE TO SPEAK TO MD ABOUT A PRIOR VM LEFT ABOUT GETTING A SCAN AT [facility] [address].  PT UNSURE OF DETAILS

## 2022-04-29 NOTE — TELEPHONE ENCOUNTER
Patient called back regarding scan, patient does not know what the scan is for and received a call from hospital to schedule. I let patient know nurse was in clinic and would explain the scan to her as soon as possible, patient stated \"I give up\" and hung the phone up.       Please give patient a call back as soon as possible

## 2022-04-29 NOTE — TELEPHONE ENCOUNTER
Spoke with patient, states she missed provider call on yesterday. Informed her parathyroid scan was ordered via provider after her appointment 4/11/22 re. thyroid  & [facility] will try calling her again and their # is [phone]. Express understanding.

## 2022-05-04 ENCOUNTER — TRANSCRIBE ORDER (OUTPATIENT)
Dept: SCHEDULING | Age: 72
End: 2022-05-04

## 2022-05-04 DIAGNOSIS — E04.9 THYROID ENLARGED: Primary | ICD-10-CM

## 2022-05-05 NOTE — PROGRESS NOTES
To:  Armando Figueroa MD, Emily Fortune MD, Janie Watson MD    From: Lamine See MD    Thank you for sending Pao Monzon to see us. Please note that this dictation was completed with [employer]al[employer], the computer voice recognition software. Quite often unanticipated grammatical, syntax, homophones, and other interpretive errors are inadvertently transcribed by the software. Please disregard these errors. Please excuse any errors that have escaped final proofreading. Encounter Date: 4/11/2022  History and Physical    Assessment:   Hypercalcemia with elevated PTH greater than 100. Increased risk for fractures, kidney stones, abdominal pain and mental status changes. [facility] notes a possible left parathyroid adenoma but measures it to be just 5 mm in size. She was not able to tolerate an IV for sestamibi. Body mass index is 47.37 kg/m². Comorbid hypertension with chronic kidney disease, multiple sclerosis, history of kidney stones. Neurogenic bladder. No aspirin or anticoagulation. Plan:   She and I discussed the fact that since the ultrasound did not find an obviously enlarged parathyroid adenoma that it would certainly be worth giving the sestamibi a try again. We will get that arranged here at [facility].  Otherwise surgery would require possible 4 gland biopsies which would increase the risks of surgery. She expressed understanding of this and is willing to give the sestamibi another try. HPI:   Jamie Thompson is a 66 y.o. female who is seen in consultation at the request of Armando Figueroa for hypercalcemia with elevated PTH. She does have a history of kidney stones as well. No fractures. She has hypertension with some resultant chronic kidney disease. She has multiple sclerosis. She also sees Dr. Anand Escobedo the cardiologist.  She has diabetes. She has issues with depression.     Past Medical History:   Diagnosis Date    CAD (coronary artery disease)     Depression     Diabetes ([facility]     Headache(784.0)     Hypercholesterolemia     Hypertension     Musculoskeletal disorder     Nonalcoholic steatohepatitis (CROWELL)     Thyroid disease      Past Surgical History:   Procedure Laterality Date    HX CHOLECYSTECTOMY      HX HYSTERECTOMY      18's    RUDDY BIOPSY BREAST STEREOTACTIC Right     years ago - Benign    [place] TOTAL KNEE ARTHROPLASTY        Family History   Problem Relation Age of Onset   Dylan Abrams Cancer Mother     Breast Cancer Mother 61    Hypertension Sister     Hypertension Brother      Social History     Tobacco Use    Smoking status: Former Smoker    Smokeless tobacco: Never Used   Substance Use Topics    Alcohol use: No      Current Outpatient Medications   Medication Sig    clonazePAM (KLONOPIN) 1 mg tablet     doxazosin (CARDURA) 4 mg tablet     potassium chloride SA (MICRO-K) 10 mEq capsule     tolterodine ER (DETROL LA) 4 mg ER capsule     omeprazole (PRILOSEC) 20 mg capsule Take 20 mg by mouth daily.  DULoxetine (CYMBALTA) 60 mg capsule Take 60 mg by mouth daily.  gabapentin (NEURONTIN) 400 mg capsule Take 400 mg by mouth three (3) times daily.  furosemide (LASIX) 40 mg tablet Take  by mouth daily.  levothyroxine (SYNTHROID) 100 mcg tablet Take  by mouth Daily (before breakfast).  rOPINIRole (REQUIP) 1 mg tablet Take  by mouth three (3) times daily.  glatiramer (COPAXONE) 20 mg injection 20 mg by SubCUTAneous route daily.  multivitamin (ONE A DAY) tablet Take 1 Tab by mouth daily. No current facility-administered medications for this visit. Allergies: Allergies   Allergen Reactions    Bactrim [Sulfamethoprim] Other (comments)    Dilaudid [Hydromorphone] Hives    Lortab Asa Hives    Percocet [Oxycodone-Acetaminophen] Hives       Review of Systems:  10 systems reviewed. See scanned sheet in \"Media\" section. See HPI for pertinent positives and negatives.       Objective:     Visit Vitals  BP (!) 133/59 (BP 1 Location: Right upper arm, BP Patient Position: Sitting, BP Cuff Size: Adult)   Pulse 66   Temp 97.2 °F (36.2 °C)   Resp 20   Ht 5' 2\" (1.575 m)   Wt 117.5 kg (259 lb)   SpO2 97%   BMI 47.37 kg/m²       Physical Exam:  General appearance  Alert, cooperative, no distress, appears stated age   [de-identified] Anicteric. No palpable enlarged glands. Lungs   Clear to auscultation bilaterally   Heart  Regular rate and rhythm. Abdomen   Soft, non-tender. Extremities no cyanosis or edema   Pulses 2+ right radial       Lymph nodes No palpable neck LAD.    Neurologic Without overt sensory or motor deficit       Signed By: Maury Silverio MD     May 5, 2022

## 2022-05-06 ENCOUNTER — TELEPHONE (OUTPATIENT)
Dept: SURGERY | Age: 72
End: 2022-05-06

## 2022-05-13 ENCOUNTER — HOSPITAL ENCOUNTER (OUTPATIENT)
Dept: ULTRASOUND IMAGING | Age: 72
Discharge: HOME | End: 2022-05-13
Attending: SURGERY
Payer: MEDICARE

## 2022-05-13 ENCOUNTER — HOSPITAL ENCOUNTER (OUTPATIENT)
Dept: NUCLEAR MEDICINE | Age: 72
Discharge: HOME | End: 2022-05-13
Attending: SURGERY
Payer: MEDICARE

## 2022-05-13 DIAGNOSIS — E83.52 HYPERCALCEMIA: ICD-10-CM

## 2022-05-13 DIAGNOSIS — E04.9 THYROID ENLARGED: ICD-10-CM

## 2022-05-13 PROCEDURE — 76536 US EXAM OF HEAD AND NECK: CPT

## 2022-05-13 PROCEDURE — 78070 PARATHYROID PLANAR IMAGING: CPT

## 2022-05-13 RX ORDER — TETRAKIS(2-METHOXYISOBUTYLISOCYANIDE)COPPER(I) TETRAFLUOROBORATE 1 MG/ML
25.7 INJECTION, POWDER, LYOPHILIZED, FOR SOLUTION INTRAVENOUS
Status: COMPLETED | OUTPATIENT
Start: 2022-05-13 | End: 2022-05-13

## 2022-05-13 RX ADMIN — TETRAKIS(2-METHOXYISOBUTYLISOCYANIDE)COPPER(I) TETRAFLUOROBORATE 25.7 MILLICURIE: 1 INJECTION, POWDER, LYOPHILIZED, FOR SOLUTION INTRAVENOUS at 11:00

## 2022-05-16 ENCOUNTER — TELEPHONE (OUTPATIENT)
Dept: SURGERY | Age: 72
End: 2022-05-16

## 2022-05-16 NOTE — TELEPHONE ENCOUNTER
Patient wanted Dr. Tony Bartlett know that she had the ultrasound and nuclear medicine test done on 5/13/22.

## 2022-06-21 ENCOUNTER — TELEPHONE (OUTPATIENT)
Dept: SURGERY | Age: 72
End: 2022-06-21

## 2022-06-21 NOTE — TELEPHONE ENCOUNTER
Called MS. Richard Delacruz a few days ago about options regarding her hypercalcemia. No parathyroid adenoma was identified on sestamibi, nor on the ultrasound done at [address] [place] [address] are mildly elevated, and her PTH was approximately 100 at an outside facility. She has had kidney stones but no fractures and her DEXA scan did not reveal any osteoporosis. We discussed the options of a 4 gland exploration with biopsies. At this point she would like to hold off. She has our number and will give us a call if she decides to pursue surgery. I discussed this with Dr. Samson Lee. Obviously if her calcium levels increase, we can always revisit.

## 2022-08-02 ENCOUNTER — TELEPHONE (OUTPATIENT)
Dept: SURGERY | Age: 72
End: 2022-08-02

## 2022-08-02 NOTE — TELEPHONE ENCOUNTER
Patient called and is wanting a copy of her office notes and imaging she had done mailed to her, please give patient a call when mailed

## 2022-08-03 NOTE — TELEPHONE ENCOUNTER
Patient called back returning phone call from nurse, patient is unhappy she was called after 5 yesterday and when she returned the call she got the answering system. Advised patient the nurse called as soon as she could and was told the nurse does not call back in a timely manner and her time is just as valuable as the patients in office.  Do not call patient after 5pm

## 2022-08-03 NOTE — TELEPHONE ENCOUNTER
Spoke with patient, informed her will check with provider & will get back with her. Express understanding.

## 2022-08-09 NOTE — TELEPHONE ENCOUNTER
Called to discuss her questions. No answer. Left voicemail. She and I had discussed options over the phone in June and she had decided that she did not want to explore a 4 gland exploration. She has requested copies of her notes and I will have them mailed to her.

## 2023-07-23 ENCOUNTER — HOSPITAL ENCOUNTER (EMERGENCY)
Facility: HOSPITAL | Age: 73
Discharge: HOME | End: 2023-07-23
Attending: EMERGENCY MEDICINE
Payer: MEDICARE

## 2023-07-23 ENCOUNTER — APPOINTMENT (OUTPATIENT)
Facility: HOSPITAL | Age: 73
End: 2023-07-23
Payer: MEDICARE

## 2023-07-23 VITALS
BODY MASS INDEX: 45.64 KG/M2 | SYSTOLIC BLOOD PRESSURE: 132 MMHG | DIASTOLIC BLOOD PRESSURE: 64 MMHG | RESPIRATION RATE: 14 BRPM | HEART RATE: 68 BPM | HEIGHT: 62 IN | OXYGEN SATURATION: 96 % | TEMPERATURE: 97.8 F | WEIGHT: 248 LBS

## 2023-07-23 DIAGNOSIS — L03.114 CELLULITIS OF LEFT UPPER EXTREMITY: ICD-10-CM

## 2023-07-23 DIAGNOSIS — M79.602 LEFT ARM PAIN: Primary | ICD-10-CM

## 2023-07-23 LAB
ALBUMIN SERPL-MCNC: 3.4 G/DL (ref 3.5–5)
ALBUMIN/GLOB SERPL: 0.9 (ref 1.1–2.2)
ALP SERPL-CCNC: 99 U/L (ref 45–117)
ALT SERPL-CCNC: 22 U/L (ref 12–78)
ANION GAP SERPL CALC-SCNC: 9 MMOL/L (ref 5–15)
AST SERPL-CCNC: 22 U/L (ref 15–37)
BASOPHILS # BLD: 0 K/UL (ref 0–0.1)
BASOPHILS NFR BLD: 0 % (ref 0–1)
BILIRUB SERPL-MCNC: 0.6 MG/DL (ref 0.2–1)
BUN SERPL-MCNC: 27 MG/DL (ref 6–20)
BUN/CREAT SERPL: 21 (ref 12–20)
CALCIUM SERPL-MCNC: 9.6 MG/DL (ref 8.5–10.1)
CHLORIDE SERPL-SCNC: 107 MMOL/L (ref 97–108)
CO2 SERPL-SCNC: 25 MMOL/L (ref 21–32)
CREAT SERPL-MCNC: 1.28 MG/DL (ref 0.55–1.02)
DIFFERENTIAL METHOD BLD: ABNORMAL
ECHO BSA: 2.22 M2
EOSINOPHIL # BLD: 0.2 K/UL (ref 0–0.4)
EOSINOPHIL NFR BLD: 3 % (ref 0–7)
ERYTHROCYTE [DISTWIDTH] IN BLOOD BY AUTOMATED COUNT: 13.7 % (ref 11.5–14.5)
GLOBULIN SER CALC-MCNC: 3.7 G/DL (ref 2–4)
GLUCOSE SERPL-MCNC: 107 MG/DL (ref 65–100)
HCT VFR BLD AUTO: 31.8 % (ref 35–47)
HGB BLD-MCNC: 10.4 G/DL (ref 11.5–16)
IMM GRANULOCYTES # BLD AUTO: 0 K/UL (ref 0–0.04)
IMM GRANULOCYTES NFR BLD AUTO: 0 % (ref 0–0.5)
LYMPHOCYTES # BLD: 1 K/UL (ref 0.8–3.5)
LYMPHOCYTES NFR BLD: 20 % (ref 12–49)
MCH RBC QN AUTO: 29.5 PG (ref 26–34)
MCHC RBC AUTO-ENTMCNC: 32.7 G/DL (ref 30–36.5)
MCV RBC AUTO: 90.1 FL (ref 80–99)
MONOCYTES # BLD: 0.4 K/UL (ref 0–1)
MONOCYTES NFR BLD: 8 % (ref 5–13)
NEUTS SEG # BLD: 3.5 K/UL (ref 1.8–8)
NEUTS SEG NFR BLD: 69 % (ref 32–75)
NRBC # BLD: 0 K/UL (ref 0–0.01)
NRBC BLD-RTO: 0 PER 100 WBC
PLATELET # BLD AUTO: 112 K/UL (ref 150–400)
PMV BLD AUTO: 9.9 FL (ref 8.9–12.9)
POTASSIUM SERPL-SCNC: 4 MMOL/L (ref 3.5–5.1)
PROT SERPL-MCNC: 7.1 G/DL (ref 6.4–8.2)
RBC # BLD AUTO: 3.53 M/UL (ref 3.8–5.2)
SODIUM SERPL-SCNC: 141 MMOL/L (ref 136–145)
WBC # BLD AUTO: 5.1 K/UL (ref 3.6–11)

## 2023-07-23 PROCEDURE — 99284 EMERGENCY DEPT VISIT MOD MDM: CPT

## 2023-07-23 PROCEDURE — 80053 COMPREHEN METABOLIC PANEL: CPT

## 2023-07-23 PROCEDURE — 87040 BLOOD CULTURE FOR BACTERIA: CPT

## 2023-07-23 PROCEDURE — 36415 COLL VENOUS BLD VENIPUNCTURE: CPT

## 2023-07-23 PROCEDURE — 71045 X-RAY EXAM CHEST 1 VIEW: CPT

## 2023-07-23 PROCEDURE — 93971 EXTREMITY STUDY: CPT

## 2023-07-23 PROCEDURE — 85025 COMPLETE CBC W/AUTO DIFF WBC: CPT

## 2023-07-23 RX ORDER — AMOXICILLIN AND CLAVULANATE POTASSIUM 875; 125 MG/1; MG/1
1 TABLET, FILM COATED ORAL 2 TIMES DAILY
Qty: 14 TABLET | Refills: 0 | Status: SHIPPED | OUTPATIENT
Start: 2023-07-23 | End: 2023-07-30

## 2023-07-23 ASSESSMENT — ENCOUNTER SYMPTOMS
SINUS PAIN: 0
SHORTNESS OF BREATH: 0
CHEST TIGHTNESS: 0
COLOR CHANGE: 1

## 2023-07-23 NOTE — ED TRIAGE NOTES
Patient arrives by wheelchair with c/o left arm swelling and redness from the wrist to the elbow for a few days, and now the redness and swelling has traveled to the left shoulder. Denies injury, pain, nausea, vomiting. Was seen at [facility] and was told to come here to get ruled out for blood clot.

## 2023-07-28 LAB
BACTERIA SPEC CULT: NORMAL
BACTERIA SPEC CULT: NORMAL
SERVICE CMNT-IMP: NORMAL
SERVICE CMNT-IMP: NORMAL

## 2023-07-30 ENCOUNTER — HOSPITAL ENCOUNTER (EMERGENCY)
Facility: HOSPITAL | Age: 73
Discharge: HOME | End: 2023-07-30
Attending: STUDENT IN AN ORGANIZED HEALTH CARE EDUCATION/TRAINING PROGRAM
Payer: MEDICARE

## 2023-07-30 ENCOUNTER — APPOINTMENT (OUTPATIENT)
Facility: HOSPITAL | Age: 73
End: 2023-07-30
Payer: MEDICARE

## 2023-07-30 VITALS
RESPIRATION RATE: 16 BRPM | SYSTOLIC BLOOD PRESSURE: 155 MMHG | HEART RATE: 68 BPM | DIASTOLIC BLOOD PRESSURE: 81 MMHG | WEIGHT: 245 LBS | TEMPERATURE: 98 F | BODY MASS INDEX: 44.81 KG/M2 | OXYGEN SATURATION: 96 %

## 2023-07-30 DIAGNOSIS — R22.32 LOCALIZED SWELLING OF LEFT FOREARM: Primary | ICD-10-CM

## 2023-07-30 LAB
ALBUMIN SERPL-MCNC: 3.8 G/DL (ref 3.5–5)
ALBUMIN/GLOB SERPL: 0.9 (ref 1.1–2.2)
ALP SERPL-CCNC: 109 U/L (ref 45–117)
ALT SERPL-CCNC: 26 U/L (ref 12–78)
ANION GAP SERPL CALC-SCNC: 13 MMOL/L (ref 5–15)
AST SERPL-CCNC: 27 U/L (ref 15–37)
BASOPHILS # BLD: 0 K/UL (ref 0–0.1)
BASOPHILS NFR BLD: 0 % (ref 0–1)
BILIRUB SERPL-MCNC: 0.5 MG/DL (ref 0.2–1)
BUN SERPL-MCNC: 21 MG/DL (ref 6–20)
BUN/CREAT SERPL: 19 (ref 12–20)
CALCIUM SERPL-MCNC: 10.4 MG/DL (ref 8.5–10.1)
CHLORIDE SERPL-SCNC: 109 MMOL/L (ref 97–108)
CO2 SERPL-SCNC: 21 MMOL/L (ref 21–32)
CREAT SERPL-MCNC: 1.13 MG/DL (ref 0.55–1.02)
DIFFERENTIAL METHOD BLD: ABNORMAL
EOSINOPHIL # BLD: 0.2 K/UL (ref 0–0.4)
EOSINOPHIL NFR BLD: 4 % (ref 0–7)
ERYTHROCYTE [DISTWIDTH] IN BLOOD BY AUTOMATED COUNT: 13.1 % (ref 11.5–14.5)
GLOBULIN SER CALC-MCNC: 4.1 G/DL (ref 2–4)
GLUCOSE SERPL-MCNC: 65 MG/DL (ref 65–100)
HCT VFR BLD AUTO: 30.6 % (ref 35–47)
HGB BLD-MCNC: 10.2 G/DL (ref 11.5–16)
IMM GRANULOCYTES # BLD AUTO: 0 K/UL (ref 0–0.04)
IMM GRANULOCYTES NFR BLD AUTO: 0 % (ref 0–0.5)
LACTATE BLD-SCNC: 1.02 MMOL/L (ref 0.4–2)
LYMPHOCYTES # BLD: 1.6 K/UL (ref 0.8–3.5)
LYMPHOCYTES NFR BLD: 25 % (ref 12–49)
MCH RBC QN AUTO: 29.9 PG (ref 26–34)
MCHC RBC AUTO-ENTMCNC: 33.3 G/DL (ref 30–36.5)
MCV RBC AUTO: 89.7 FL (ref 80–99)
MONOCYTES # BLD: 0.5 K/UL (ref 0–1)
MONOCYTES NFR BLD: 8 % (ref 5–13)
NEUTS SEG # BLD: 3.9 K/UL (ref 1.8–8)
NEUTS SEG NFR BLD: 63 % (ref 32–75)
NRBC # BLD: 0 K/UL (ref 0–0.01)
NRBC BLD-RTO: 0 PER 100 WBC
PLATELET # BLD AUTO: 182 K/UL (ref 150–400)
PMV BLD AUTO: 9.3 FL (ref 8.9–12.9)
POTASSIUM SERPL-SCNC: 3.7 MMOL/L (ref 3.5–5.1)
PROT SERPL-MCNC: 7.9 G/DL (ref 6.4–8.2)
RBC # BLD AUTO: 3.41 M/UL (ref 3.8–5.2)
SODIUM SERPL-SCNC: 143 MMOL/L (ref 136–145)
URATE SERPL-MCNC: 2.3 MG/DL (ref 2.6–6)
WBC # BLD AUTO: 6.3 K/UL (ref 3.6–11)

## 2023-07-30 PROCEDURE — 85025 COMPLETE CBC W/AUTO DIFF WBC: CPT

## 2023-07-30 PROCEDURE — 80053 COMPREHEN METABOLIC PANEL: CPT

## 2023-07-30 PROCEDURE — 84550 ASSAY OF BLOOD/URIC ACID: CPT

## 2023-07-30 PROCEDURE — 99284 EMERGENCY DEPT VISIT MOD MDM: CPT

## 2023-07-30 PROCEDURE — 73080 X-RAY EXAM OF ELBOW: CPT

## 2023-07-30 PROCEDURE — 36415 COLL VENOUS BLD VENIPUNCTURE: CPT

## 2023-07-30 PROCEDURE — 6370000000 HC RX 637 (ALT 250 FOR IP): Performed by: STUDENT IN AN ORGANIZED HEALTH CARE EDUCATION/TRAINING PROGRAM

## 2023-07-30 PROCEDURE — 83605 ASSAY OF LACTIC ACID: CPT

## 2023-07-30 PROCEDURE — 73090 X-RAY EXAM OF FOREARM: CPT

## 2023-07-30 RX ORDER — PREDNISONE 20 MG/1
20 TABLET ORAL
Status: COMPLETED | OUTPATIENT
Start: 2023-07-30 | End: 2023-07-30

## 2023-07-30 RX ORDER — PREDNISONE 20 MG/1
20 TABLET ORAL DAILY
Qty: 5 TABLET | Refills: 0 | Status: SHIPPED | OUTPATIENT
Start: 2023-07-30 | End: 2023-08-04

## 2023-07-30 RX ORDER — DOXYCYCLINE HYCLATE 100 MG
100 TABLET ORAL 2 TIMES DAILY
Qty: 14 TABLET | Refills: 0 | Status: SHIPPED | OUTPATIENT
Start: 2023-07-30 | End: 2023-08-06

## 2023-07-30 RX ORDER — DOXYCYCLINE HYCLATE 100 MG
100 TABLET ORAL ONCE
Status: COMPLETED | OUTPATIENT
Start: 2023-07-30 | End: 2023-07-30

## 2023-07-30 RX ADMIN — PREDNISONE 20 MG: 20 TABLET ORAL at 18:19

## 2023-07-30 RX ADMIN — DOXYCYCLINE HYCLATE 100 MG: 100 TABLET, COATED ORAL at 18:19

## 2023-07-30 ASSESSMENT — PAIN SCALES - GENERAL: PAINLEVEL_OUTOF10: 8

## 2023-07-30 ASSESSMENT — PAIN DESCRIPTION - DESCRIPTORS: DESCRIPTORS: ACHING

## 2023-07-30 ASSESSMENT — PAIN DESCRIPTION - ORIENTATION: ORIENTATION: LEFT

## 2023-07-30 ASSESSMENT — LIFESTYLE VARIABLES: HOW OFTEN DO YOU HAVE A DRINK CONTAINING ALCOHOL: NEVER

## 2023-07-30 ASSESSMENT — PAIN - FUNCTIONAL ASSESSMENT: PAIN_FUNCTIONAL_ASSESSMENT: 0-10

## 2023-07-30 ASSESSMENT — PAIN DESCRIPTION - LOCATION: LOCATION: ARM

## 2023-07-30 NOTE — ED TRIAGE NOTES
Patient presents to the ED with chief complaint of left arm redness and swelling. Patient was evaluated last weekend at [facility] for same complaint. She was diagnosed with cellulitis and prescribed Augmentin. She finished the course of abx and symptoms have not improved.

## 2023-07-30 NOTE — ED PROVIDER NOTES
Attending Physician / Physician Assistant / Nurse Practitioner             Antonia Hardy MD  07/30/23 1955

## 2023-07-30 NOTE — DISCHARGE INSTRUCTIONS
Monitor for any report to streaking of redness, especially if associated with systemic symptoms of fevers and chills-- in which case, please return to the ER for further assessment. Similarly- if you notice inability to bend your elbow, wrist due to intractable pain with associated worsening swelling- return to the ER. Finally- if your forearm becomes rigid and tense, with accompanied severe pain, especially if associated with pallor/discoloration in your digits, or if you experience weakness, numbness of your fingers- please return to the ER.

## 2023-07-30 NOTE — ED NOTES
Patient stable at time of discharge. Reviewed discharge instructions, follow up, and medications. Allowed time for questions. Patient verbalized understanding. Ambulatory out of the ER using personal rollator.      Bishop Shanks RN  07/30/23 1504